# Patient Record
Sex: FEMALE | Race: WHITE
[De-identification: names, ages, dates, MRNs, and addresses within clinical notes are randomized per-mention and may not be internally consistent; named-entity substitution may affect disease eponyms.]

---

## 2021-12-10 ENCOUNTER — HOSPITAL ENCOUNTER (EMERGENCY)
Dept: HOSPITAL 26 - MED | Age: 31
Discharge: HOME | End: 2021-12-10
Payer: COMMERCIAL

## 2021-12-10 VITALS — DIASTOLIC BLOOD PRESSURE: 72 MMHG | SYSTOLIC BLOOD PRESSURE: 111 MMHG

## 2021-12-10 VITALS — HEIGHT: 65 IN | BODY MASS INDEX: 31.16 KG/M2 | WEIGHT: 187 LBS

## 2021-12-10 VITALS — SYSTOLIC BLOOD PRESSURE: 107 MMHG | DIASTOLIC BLOOD PRESSURE: 69 MMHG

## 2021-12-10 DIAGNOSIS — E11.9: ICD-10-CM

## 2021-12-10 DIAGNOSIS — G93.89: Primary | ICD-10-CM

## 2021-12-10 DIAGNOSIS — E07.9: ICD-10-CM

## 2023-07-26 ENCOUNTER — HOSPITAL ENCOUNTER (EMERGENCY)
Dept: HOSPITAL 26 - MED | Age: 33
Discharge: HOME | End: 2023-07-26
Payer: COMMERCIAL

## 2023-07-26 VITALS — HEIGHT: 65 IN | WEIGHT: 175 LBS | BODY MASS INDEX: 29.16 KG/M2

## 2023-07-26 VITALS
RESPIRATION RATE: 20 BRPM | SYSTOLIC BLOOD PRESSURE: 109 MMHG | HEART RATE: 68 BPM | TEMPERATURE: 98 F | DIASTOLIC BLOOD PRESSURE: 73 MMHG | OXYGEN SATURATION: 100 %

## 2023-07-26 VITALS — OXYGEN SATURATION: 100 %

## 2023-07-26 VITALS
RESPIRATION RATE: 17 BRPM | DIASTOLIC BLOOD PRESSURE: 71 MMHG | OXYGEN SATURATION: 98 % | HEART RATE: 79 BPM | SYSTOLIC BLOOD PRESSURE: 109 MMHG

## 2023-07-26 DIAGNOSIS — R51.9: Primary | ICD-10-CM

## 2023-07-26 DIAGNOSIS — E11.9: ICD-10-CM

## 2023-07-26 DIAGNOSIS — Z79.1: ICD-10-CM

## 2023-07-26 DIAGNOSIS — E03.9: ICD-10-CM

## 2023-07-26 NOTE — NUR
Patient discharged with v/s stable. Written and verbal after care instructions 
given and explained. 

Patient alert, oriented and verbalized understanding of instructions. 
Ambulatory with steady gait. All questions addressed prior to discharge. ID 
band removed. Patient advised to follow up with PMD. Rx of naprosyn given. 
Patient educated on indication of medication including possible reaction and 
side effects. Opportunity to ask questions provided and answered.

## 2023-07-26 NOTE — NUR
33YO FEMALE PT C/O INCREASED PREASSURED HEADACHE X2DAYS. REPORTS INITIAL SUDDEN 
ONSET X MONTHS. DENIES DIZZINESS, CHANGE IN VISION, N/V/D, CHEST PAIN, SOB, 
INJURY OR RELIEF AFTER TYLENOL/EXCEDRIN. PT AAOX4, AMB W/ STEADY GAIT. HOB 
POSITIONED PER COMFORT. CALL LIGHT WITHIN REACH.



HX: HYPERTHYROIDISM, DM2

NKA